# Patient Record
(demographics unavailable — no encounter records)

---

## 2025-06-19 NOTE — HEALTH RISK ASSESSMENT
[Good] : ~his/her~  mood as  good [Yes] : Yes [No falls in past year] : Patient reported no falls in the past year [0] : 2) Feeling down, depressed, or hopeless: Not at all (0) [With Significant Other] : lives with significant other [With Family] : lives with family [Employed] : employed [College] : College [Single] : single [Feels Safe at Home] : Feels safe at home [Fully functional (bathing, dressing, toileting, transferring, walking, feeding)] : Fully functional (bathing, dressing, toileting, transferring, walking, feeding) [Fully functional (using the telephone, shopping, preparing meals, housekeeping, doing laundry, using] : Fully functional and needs no help or supervision to perform IADLs (using the telephone, shopping, preparing meals, housekeeping, doing laundry, using transportation, managing medications and managing finances) [Reports changes in hearing] : Reports changes in hearing [Reports changes in vision] : Reports changes in vision [Reports changes in dental health] : Reports changes in dental health [Smoke Detector] : smoke detector [Carbon Monoxide Detector] : carbon monoxide detector [Seat Belt] :  uses seat belt [Sunscreen] : uses sunscreen [Never] : Never [de-identified] : rarely [de-identified] : Rarely [[id number]] : 0 [Change in mental status noted] : No change in mental status noted [Language] : denies difficulty with language [Behavior] : denies difficulty with behavior [Handling Complex Tasks] : denies difficulty handling complex tasks [Reasoning] : denies difficulty with reasoning [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

## 2025-06-19 NOTE — HISTORY OF PRESENT ILLNESS
[FreeTextEntry1] : The patient is a 27 year old male who presents for annual physical examination and to establish care.  [de-identified] : Patient is a 27-year-old male with past medical history significant for hypertriglyceridemia, presents for annual wellness exam and to establish care. Patient reports that he has noticed occasional "blurred vision".  Has not had an eye exam in many years.  Does not wear glasses. He also states that he has been told that he has an "abnormal EKG".  Several years ago he was sent to the ER and had subsequent cardiac workup which was all negative.  He denies chest pain, shortness of breath, palpitations, dizziness, lightheadedness, syncope.  Admits he does not exercise regularly.  Weight has remained relatively stable. Has not received tetanus vaccine in over 10 years.  Agrees to receive it today.

## 2025-06-19 NOTE — DATA REVIEWED
[FreeTextEntry1] : EKG: Normal sinus rhythm at 83 bpm, nonspecific QRS widening, anterolateral ST elevation-repolarization variant.  Prior EKG is not available for review.

## 2025-07-09 NOTE — HISTORY OF PRESENT ILLNESS
[FreeTextEntry1] : Patient is a 27-year-old male presents for follow-up after recent ER visit. [de-identified] : Patient is a 27-year-old male with past medical history significant for hypertriglyceridemia, elevated LFTs, nonalcoholic fatty liver disease presents for follow-up after ED visit on 7/1/2025. He reports that he sought medical attention after developing significant abdominal pain.  Describes the pain as sporadic and diffuse in nature.  He denied associated fever, chills, nausea, vomiting, diarrhea, bright red blood per rectum, or melena. CT scan in the ER revealed "hepatomegaly and diffuse hepatic steatosis.  Possible mild enteritis." Blood work revealed following abnormalities: ALT = 175, AST = 58 Urinalysis within normal limits. He was advised to follow-up with gastroenterologist. He was treated in the emergency room with IV fluids, ibuprofen, acetaminophen.  Symptoms improved and he was discharged home. Patient states that he feels much improved.  Continues to have occasional abdominal symptoms but described as mild. He has not yet scheduled GI follow-up. Patient is scheduled for hepatology consultation in October, 2025.  This appointment was scheduled prior to recent ER visit due to significantly elevated LFTs noted on routine blood work. Patient admits that he has been eating a much healthier diet.  Limiting carbohydrates, cheese, junk food. Also has cut back on juices and drinking primarily water. Has been exercising more regularly.

## 2025-07-09 NOTE — PHYSICAL EXAM
[Normal Sclera/Conjunctiva] : normal sclera/conjunctiva [EOMI] : extraocular movements intact [No Edema] : there was no peripheral edema [Normal] : soft, non-tender, non-distended, no masses palpated, no HSM and normal bowel sounds [Grossly Normal Strength/Tone] : grossly normal strength/tone [No Rash] : no rash [No Focal Deficits] : no focal deficits [Normal Affect] : the affect was normal [Alert and Oriented x3] : oriented to person, place, and time [Normal Insight/Judgement] : insight and judgment were intact